# Patient Record
Sex: FEMALE | Race: ASIAN
[De-identification: names, ages, dates, MRNs, and addresses within clinical notes are randomized per-mention and may not be internally consistent; named-entity substitution may affect disease eponyms.]

---

## 2019-04-21 ENCOUNTER — HOSPITAL ENCOUNTER (EMERGENCY)
Dept: HOSPITAL 25 - ED | Age: 29
LOS: 1 days | Discharge: HOME | End: 2019-04-22
Payer: COMMERCIAL

## 2019-04-21 DIAGNOSIS — N39.0: Primary | ICD-10-CM

## 2019-04-21 LAB
RBC UR QL AUTO: (no result)
VIT C UR QL: (no result)
WBC UR QL AUTO: (no result)

## 2019-04-21 PROCEDURE — 87186 SC STD MICRODIL/AGAR DIL: CPT

## 2019-04-21 PROCEDURE — 81003 URINALYSIS AUTO W/O SCOPE: CPT

## 2019-04-21 PROCEDURE — 87086 URINE CULTURE/COLONY COUNT: CPT

## 2019-04-21 PROCEDURE — 99283 EMERGENCY DEPT VISIT LOW MDM: CPT

## 2019-04-21 PROCEDURE — 81015 MICROSCOPIC EXAM OF URINE: CPT

## 2019-04-21 PROCEDURE — 87077 CULTURE AEROBIC IDENTIFY: CPT

## 2019-04-22 VITALS — SYSTOLIC BLOOD PRESSURE: 134 MMHG | DIASTOLIC BLOOD PRESSURE: 96 MMHG

## 2019-04-22 NOTE — ED
GI/ HPI





- HPI Summary


HPI Summary: 


A 27 y/o F presents to ED with c/o urinary frequency onset yesterday (4/21/19) 

afternoon. Pt says she's having urinary retention and urgency as well since 

1900 yesterday. She denies nausea/vomiting, abd pain, flank pain and fever.











- History of Current Complaint


Chief Complaint: EDUrogenitalProblems


Time Seen by Provider: 04/22/19 00:36


Stated Complaint: CANNOT HOLD PEE PER PT


Hx Obtained From: Patient


Onset/Duration: Started Hours Ago, Still Present


Timing: Constant


Severity: Moderate


Current Severity: Moderate


Pain Intensity: 4 - out of 10


Associated Signs and Symptoms: Positive: Other: - pos: frequency, urgency, 

retention.  Negative: Nausea, Vomiting, Fever, Abdominal Pain





- Allergy/Home Medications


Allergies/Adverse Reactions: 


 Allergies











Allergy/AdvReac Type Severity Reaction Status Date / Time


 


No Known Allergies Allergy   Verified 04/21/19 22:39














PMH/Surg Hx/FS Hx/Imm Hx


Previously Healthy: Yes


 History: Reports: Other  Problems/Disorders - neg: UTI


Sensory History: 


   Denies: Hx Legally Blind


Opthamlomology History: 


   Denies: Hx Legally Blind


EENT History: 


   Denies: Hx Deafness


Infectious Disease History: No


Infectious Disease History: 


   Denies: Traveled Outside the [place] in Last 30 Days





- Social History


Occupation: Student


Lives: Alone





Review of Systems


Negative: Fever


Negative: Abdominal Pain, Vomiting, Nausea


Positive: frequency, urgency, other - pos: urinary retention.  Negative: flank 

pain


All Other Systems Reviewed And Are Negative: Yes





Physical Exam





- Summary


Physical Exam Summary: 





Appearance: Well-appearing, Well-nourished, lying in bed comfortable


Skin: Warm, dry, no obvious rash


Eyes: sclera anicteric, no conjunctival pallor


ENT: mucous membranes moist


Neck: deferred


Respiratory: No signs of respiratory distress


Cardiovascular: Appears well perfused, pulses are nml


Abdomen: No CVA tenderness


Musculoskeletal: Moving all 4 extremities without obvious discomfort


Neurological: Awake  and alert, mentation is normal, speech is fluent and 

appropriate


Psychiatric: affect is normal, does not appear anxious or depressed





Triage Information Reviewed: Yes


Vital Signs On Initial Exam: 


 Initial Vitals











Temp Pulse Resp BP Pulse Ox


 


 100 F   97   16   137/101   97 


 


 04/21/19 22:36  04/21/19 22:36  04/21/19 22:36  04/21/19 22:36  04/21/19 22:36











Vital Signs Reviewed: Yes





Diagnostics





- Vital Signs


 Vital Signs











  Temp Pulse Resp BP Pulse Ox


 


 04/21/19 22:36  100 F  97  16  137/101  97














- Laboratory


Lab Results: 


 Lab Results











  04/21/19 Range/Units





  22:55 


 


Urine Color  Chel  


 


Urine Appearance  Turbid  


 


Urine pH  5.0  (5-9)  


 


Ur Specific Gravity  1.018  (1.010-1.030)  


 


Urine Protein  2+(100 mg/dl) A  (Negative)  


 


Urine Ketones  Trace A  (Negative)  


 


Urine Blood  3+ A  (Negative)  


 


Urine Nitrate  Negative  (Negative)  


 


Urine Bilirubin  Negative  (Negative)  


 


Urine Urobilinogen  Negative  (Negative)  


 


Ur Leukocyte Esterase  2+ A  (Negative)  


 


Urine WBC (Auto)  3+(>20/hpf) A  (Absent)  


 


Urine RBC (Auto)  3+(>10/hpf) A  (Absent)  


 


Urine Bacteria  Absent  (Absent)  


 


Urine Glucose  Negative  (Negative)  


 


Urine Ascorbic Acid  * A  (Negative)  











Lab Statement: Any lab studies that have been ordered have been reviewed, and 

results considered in the medical decision making process.





GIGU Course/Dx





- Course


Course Of Treatment: Pt is a 27 y/o F presenting with urinary frequency and 

urgency since yesterday. Castro PE shows no CVA tenderness.  UA results show 2+ 

protein, trace ketones, 3+ blood, 2+ leukocyte esterase, 3+ WBC, 3+ RBC and 

ascorbic acid present. Bladder scan shows 16 mL.  Pt will be discharged home.





- Diagnoses


Provider Diagnoses: 


 UTI (urinary tract infection)








Discharge





- Sign-Out/Discharge


Documenting (check all that apply): Patient Departure


Patient Received Moderate/Deep Sedation with Procedure: No





- Discharge Plan


Condition: Good


Disposition: HOME


Prescriptions: 


Phenazopyridine 200 mg (NF) [Pyridium 200 MG tab *] 200 mg PO TID PRN #10 tab


 PRN Reason: Spasms - Bladder


Sulfamethox/Trimethoprim DS* [Bactrim /160 TAB*] 1 tab PO BID 5 Days #10 

tab


Patient Education Materials:  Urinary Tract Infection in Women (ED)


Referrals: 


No Primary Care Phys,NOPCP [Primary Care Provider] - 





- Billing Disposition and Condition


Condition: GOOD


Disposition: Home





- Attestation Statements


Document Initiated by Scribe: Yes


Documenting Scribe: SooYoung RayeMark


Provider For Whom Scribe is Documenting (Include Credential): Dr. Uday Sanders MD


Scribe Attestation: 


I, Brigette Flores, aditiibed for Dr. Uday Sanders MD on 04/22/19 at 0248. 


Scribe Documentation Reviewed: Yes


Provider Attestation: 


The documentation as recorded by the aditiibaguilar, Brigette Flores accurately 

reflects the service I personally performed and the decisions made by me, Dr. Uday Sanders MD


Status of Scribe Document: Viewed

## 2019-04-25 NOTE — PN
Progress Note





- Progress Note


Date of Service: 04/21/19


Note: 


Pt. seen in ED 4/21 UTI and started on Bactrim. Urine culture today growing e. 

coli resistant to bactrim. Attempted to call pt. today at 1005 with no answer, 

message left to return call. Rx for cipro sent to pharm. based on culture. 





1620: Pt. has not returned call, will send letter.